# Patient Record
Sex: MALE | Race: BLACK OR AFRICAN AMERICAN | NOT HISPANIC OR LATINO | Employment: FULL TIME | URBAN - METROPOLITAN AREA
[De-identification: names, ages, dates, MRNs, and addresses within clinical notes are randomized per-mention and may not be internally consistent; named-entity substitution may affect disease eponyms.]

---

## 2017-08-11 ENCOUNTER — HOSPITAL ENCOUNTER (EMERGENCY)
Facility: OTHER | Age: 67
Discharge: HOME | End: 2017-08-11
Attending: EMERGENCY MEDICINE
Payer: COMMERCIAL

## 2017-08-11 VITALS
OXYGEN SATURATION: 99 % | DIASTOLIC BLOOD PRESSURE: 86 MMHG | HEART RATE: 88 BPM | BODY MASS INDEX: 23.81 KG/M2 | TEMPERATURE: 99 F | WEIGHT: 148.13 LBS | HEIGHT: 66 IN | SYSTOLIC BLOOD PRESSURE: 151 MMHG | RESPIRATION RATE: 16 BRPM

## 2017-08-11 DIAGNOSIS — M00.9: Primary | ICD-10-CM

## 2017-08-11 DIAGNOSIS — M79.671 FOOT PAIN, RIGHT: ICD-10-CM

## 2017-08-11 PROCEDURE — 99284 EMERGENCY DEPT VISIT MOD MDM: CPT

## 2017-08-11 PROCEDURE — 25000003 PHARM REV CODE 250: Performed by: EMERGENCY MEDICINE

## 2017-08-11 RX ORDER — CEPHALEXIN 500 MG/1
500 CAPSULE ORAL EVERY 12 HOURS
Qty: 28 CAPSULE | Refills: 0 | Status: SHIPPED | OUTPATIENT
Start: 2017-08-11 | End: 2017-08-25

## 2017-08-11 RX ORDER — SULFAMETHOXAZOLE AND TRIMETHOPRIM 800; 160 MG/1; MG/1
1 TABLET ORAL 2 TIMES DAILY
Qty: 28 TABLET | Refills: 0 | Status: SHIPPED | OUTPATIENT
Start: 2017-08-11 | End: 2017-08-25

## 2017-08-11 RX ORDER — SULFAMETHOXAZOLE AND TRIMETHOPRIM 800; 160 MG/1; MG/1
1 TABLET ORAL
Status: COMPLETED | OUTPATIENT
Start: 2017-08-11 | End: 2017-08-11

## 2017-08-11 RX ORDER — CEPHALEXIN 500 MG/1
500 CAPSULE ORAL
Status: COMPLETED | OUTPATIENT
Start: 2017-08-11 | End: 2017-08-11

## 2017-08-11 RX ORDER — IBUPROFEN 400 MG/1
800 TABLET ORAL
Status: COMPLETED | OUTPATIENT
Start: 2017-08-11 | End: 2017-08-11

## 2017-08-11 RX ORDER — IBUPROFEN 600 MG/1
600 TABLET ORAL EVERY 6 HOURS PRN
Qty: 20 TABLET | Refills: 0 | Status: SHIPPED | OUTPATIENT
Start: 2017-08-11

## 2017-08-11 RX ADMIN — SULFAMETHOXAZOLE AND TRIMETHOPRIM 1 TABLET: 800; 160 TABLET ORAL at 11:08

## 2017-08-11 RX ADMIN — CEPHALEXIN 500 MG: 500 CAPSULE ORAL at 11:08

## 2017-08-11 RX ADMIN — IBUPROFEN 800 MG: 400 TABLET, FILM COATED ORAL at 10:08

## 2017-08-11 NOTE — ED PROVIDER NOTES
"Encounter Date: 8/11/2017    SCRIBE #1 NOTE: I, Blanca Melendrez, am scribing for, and in the presence of, Dr. Newton.       History     Chief Complaint   Patient presents with    Toe Pain     C/o pain to right 5th toe x 1 month. States "I'm here to see if it's broken." Pt with ortho shoe in place.        Time seen by provider: 10:07 AM on 08/11/2017    Bro Howard is a 67 y.o. male with HTN who presents to the ED with complaint of constant right foot pain. The pain is located at the right 5th toe and distal lateral foot but he is unable to give a pain score or to describe the pain. He reports injuring his toe when he slammed it in the door and was seen in the ER at [facility] ~2 months ago. The patient has an upcoming appointment with Orthopedics next month on 9/11. He is an occasional alcohol drinker. The patient denies smoking tobacco, history of DM or asthma, or any other symptoms at this time. No pertinent SHx noted. No allergies noted.       The history is provided by the patient.     Review of patient's allergies indicates:  No Known Allergies  Past Medical History:   Diagnosis Date    Hypertension      Past Surgical History:   Procedure Laterality Date    APPENDECTOMY      CERVICAL SPINE SURGERY       History reviewed. No pertinent family history.  Social History   Substance Use Topics    Smoking status: Never Smoker    Smokeless tobacco: Never Used    Alcohol use Yes     Review of Systems   Constitutional: Negative for chills and fever.   HENT: Negative for nosebleeds.    Eyes: Negative for visual disturbance.   Respiratory: Negative for cough and shortness of breath.    Cardiovascular: Negative for chest pain and palpitations.   Gastrointestinal: Negative for abdominal pain, diarrhea, nausea and vomiting.   Genitourinary: Negative for dysuria and hematuria.   Musculoskeletal: Positive for arthralgias (right foot).   Skin: Negative for rash.   Neurological: Negative for seizures, syncope and " headaches.     Physical Exam     Initial Vitals [08/11/17 0932]   BP Pulse Resp Temp SpO2   (!) 151/86 88 16 98.7 °F (37.1 °C) 99 %      MAP       107.67         Physical Exam    Nursing note and vitals reviewed.  Constitutional: He appears well-developed and well-nourished. He is not diaphoretic. No distress.   HENT:   Head: Normocephalic and atraumatic.   Mouth/Throat: Oropharynx is clear and moist.   Eyes: Conjunctivae are normal.   Neck: Neck supple.   Cardiovascular: Normal rate, regular rhythm, normal heart sounds and intact distal pulses. Exam reveals no gallop and no friction rub.    No murmur heard.  Pulses:       Dorsalis pedis pulses are 2+ on the right side.   Pulmonary/Chest: Breath sounds normal. He has no wheezes. He has no rhonchi. He has no rales.   Abdominal: Soft. He exhibits no distension. There is no tenderness.   Musculoskeletal: Normal range of motion.   Neurological: He is alert and oriented to person, place, and time. No sensory deficit.   Sensation intact.    Skin: Rash noted. No erythema.   Macerated and interdigital tinea pedis with a small wound between the 3rd and 4th toe which is approximately 2 mm x 2 mm.    Psychiatric: He has a normal mood and affect. His behavior is normal. Judgment and thought content normal.       ED Course   Procedures  Labs Reviewed - No data to display     Imaging Results          X-Ray Foot Complete Right (Final result)  Result time 08/11/17 10:51:58    Final result by Prakash Boudreaux MD (08/11/17 10:51:58)                 Impression:      Bone destruction involving the distal aspect of the proximal phalanx and the proximal aspect of the middle phalanx of the right fifth toe with subluxation of the joint space. Findings are suggestive of septic arthritis.      Electronically signed by: PRAKASH BOUDREAUX MD  Date:     08/11/17  Time:    10:51              Narrative:    AP, lateral, and oblique radiographs of the left foot were obtained. There is bone destruction  involving the distal aspect of the proximal phalanx and the proximal aspect of the middle phalanx of the fifth toe with subluxation of the joint. Findings are suggestive of septic arthritis. There may be a small amount of adjacent soft tissue air present as well. The remainder of the bones appear intact. There is mild hallux valgus deformity present.                               X-Rays:   Independently Interpreted Readings:   Other Readings:  XR Right Foot: Proximal phalanx of the small toe has an absent segment. No acute appearing fracture. Will defer to the Radiologist's reading.     Medical Decision Making:   History:   Old Medical Records: I decided to obtain old medical records.  Clinical Tests:   Radiological Study: Reviewed and Ordered  ED Management:  11:22 AM  I discussed the case with Dr. Rodas including a full review of the radiologists reports and examination. If VS are normal and there are no signs of cellulitis, he advises that the patient keep the area warm and dry, 2 weeks of Keflex and Bactrim, and keep his appointment with Orthopedics.     Urgent evaluation of 67-year-old male with complaint of toe pain, ongoing since an injury 2 months ago.  On exam there is some interdigital tinea pedis as well as a small wound without purulence.  X-ray shows signs of osteomyelitis - free air noted this from the small wound.  Patient has paper which shows his appointment with orthopedics foot specialist next month.  I discussed the case with orthopedics on call.  Will treat with prolonged course of Bactrim and Keflex and have him keep his scheduled follow-up.  I did not think that there is an acute limb threatening infection but rather a more chronic osteomyelitis.  I think that outpatient management is appropriate at this time.  Additionally, he is given prescription for ibuprofen.  Symptoms is not currently taking anything at all for pain, I think this is appropriate.  He was also given a bunion shoe to help  offload.  He was encouraged to return for any new or worsening symptoms.            Scribe Attestation:   Scribe #1: I performed the above scribed service and the documentation accurately describes the services I performed. I attest to the accuracy of the note.    Attending Attestation:           Physician Attestation for Scribe:  Physician Attestation Statement for Scribe #1: I, Dr. Newton, reviewed documentation, as scribed by Blanca Melendrez in my presence, and it is both accurate and complete.                 ED Course     Clinical Impression:     1. Septic arthritis of IP joint of toe, right    2. Foot pain, right          Disposition:   Disposition: Discharged  Condition: Stable                        Mónica Newton MD  08/21/17 0922

## 2017-08-11 NOTE — ED TRIAGE NOTES
Pt with pain to right 5th toe - has been seen at [place] for the same - pt told toe was broken - pt states happened 1 month ago and still toe is black and bruised - pt want to go back to work but unable to put a shoe on